# Patient Record
Sex: MALE | Race: WHITE | HISPANIC OR LATINO | URBAN - METROPOLITAN AREA
[De-identification: names, ages, dates, MRNs, and addresses within clinical notes are randomized per-mention and may not be internally consistent; named-entity substitution may affect disease eponyms.]

---

## 2017-06-30 ENCOUNTER — EMERGENCY (EMERGENCY)
Facility: HOSPITAL | Age: 42
LOS: 1 days | Discharge: HOME | End: 2017-06-30
Attending: EMERGENCY MEDICINE | Admitting: EMERGENCY MEDICINE
Payer: MEDICAID

## 2017-06-30 VITALS
TEMPERATURE: 99 F | SYSTOLIC BLOOD PRESSURE: 187 MMHG | DIASTOLIC BLOOD PRESSURE: 118 MMHG | HEART RATE: 86 BPM | OXYGEN SATURATION: 98 % | RESPIRATION RATE: 20 BRPM

## 2017-06-30 PROCEDURE — 99284 EMERGENCY DEPT VISIT MOD MDM: CPT

## 2017-06-30 PROCEDURE — 99283 EMERGENCY DEPT VISIT LOW MDM: CPT | Mod: 25

## 2017-06-30 PROCEDURE — 96372 THER/PROPH/DIAG INJ SC/IM: CPT

## 2017-06-30 RX ORDER — KETOROLAC TROMETHAMINE 30 MG/ML
30 SYRINGE (ML) INJECTION ONCE
Qty: 0 | Refills: 0 | Status: DISCONTINUED | OUTPATIENT
Start: 2017-06-30 | End: 2017-06-30

## 2017-06-30 RX ORDER — CYCLOBENZAPRINE HYDROCHLORIDE 10 MG/1
1 TABLET, FILM COATED ORAL
Qty: 20 | Refills: 0 | OUTPATIENT
Start: 2017-06-30

## 2017-06-30 RX ORDER — DIAZEPAM 5 MG
5 TABLET ORAL ONCE
Qty: 0 | Refills: 0 | Status: DISCONTINUED | OUTPATIENT
Start: 2017-06-30 | End: 2017-06-30

## 2017-06-30 RX ORDER — OXYCODONE HYDROCHLORIDE 5 MG/1
1 TABLET ORAL
Qty: 20 | Refills: 0 | OUTPATIENT
Start: 2017-06-30

## 2017-06-30 RX ORDER — OXYCODONE HYDROCHLORIDE 5 MG/1
5 TABLET ORAL ONCE
Qty: 0 | Refills: 0 | Status: DISCONTINUED | OUTPATIENT
Start: 2017-06-30 | End: 2017-06-30

## 2017-06-30 RX ORDER — TIZANIDINE 4 MG/1
0 TABLET ORAL
Qty: 0 | Refills: 0 | COMMUNITY

## 2017-06-30 RX ADMIN — Medication 5 MILLIGRAM(S): at 23:29

## 2017-06-30 RX ADMIN — Medication 30 MILLIGRAM(S): at 23:30

## 2017-06-30 RX ADMIN — OXYCODONE HYDROCHLORIDE 5 MILLIGRAM(S): 5 TABLET ORAL at 22:13

## 2017-06-30 RX ADMIN — Medication 5 MILLIGRAM(S): at 22:13

## 2017-06-30 RX ADMIN — OXYCODONE HYDROCHLORIDE 5 MILLIGRAM(S): 5 TABLET ORAL at 23:30

## 2017-06-30 RX ADMIN — OXYCODONE HYDROCHLORIDE 5 MILLIGRAM(S): 5 TABLET ORAL at 23:29

## 2017-06-30 RX ADMIN — Medication 30 MILLIGRAM(S): at 22:15
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
## 2017-06-30 NOTE — ED PROVIDER NOTE - MEDICAL DECISION MAKING DETAILS
Attending note-left lower back pain with sciatica. NSAIDs, muscle relaxants, opiates and reassess. Patient to follow up with the spine. Patient was advised if he develops red flag severely returned to the ER for further evaluation.
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
## 2017-06-30 NOTE — ED PROVIDER NOTE - PHYSICAL EXAMINATION
NAD, Hypertensive, Afebrile, No spinal mid tender, + left para lumbar tender, No sciatica tender, No CVA tender, Neuro- intact. NAD, Hypertensive, Afebrile, No spinal mid tender, + left para lumbar tender, No sciatica tender, No CVA tender, Neuro- intact.       Attending note. Patient is alert and in severe pain. Patient describes pain as 8/10. Patient has no palpable tenderness to the lumbar spine however her pain is localized to the left paralumbar in left sciatic notch. Straight leg raise is positive. Cross straight leg raise is also positive. Sensation is normal. DTRs are +2/4 equal symmetrical. There is no clonus. Abdomen is soft and nontender. There is no CVA tenderness.

## 2017-06-30 NOTE — ED ADULT NURSE NOTE - CHPI ED SYMPTOMS NEG
no bowel dysfunction/no fatigue/no anorexia/no bladder dysfunction/no neck tenderness/no motor function loss/no constipation

## 2017-07-01 VITALS
DIASTOLIC BLOOD PRESSURE: 95 MMHG | RESPIRATION RATE: 20 BRPM | SYSTOLIC BLOOD PRESSURE: 153 MMHG | HEART RATE: 86 BPM | OXYGEN SATURATION: 98 % | TEMPERATURE: 98 F

## 2020-03-05 ENCOUNTER — EMERGENCY (EMERGENCY)
Facility: HOSPITAL | Age: 45
LOS: 1 days | Discharge: HOME | End: 2020-03-05
Attending: STUDENT IN AN ORGANIZED HEALTH CARE EDUCATION/TRAINING PROGRAM
Payer: SELF-PAY

## 2020-03-05 VITALS
TEMPERATURE: 98 F | WEIGHT: 253.09 LBS | OXYGEN SATURATION: 97 % | HEIGHT: 70 IN | HEART RATE: 63 BPM | SYSTOLIC BLOOD PRESSURE: 151 MMHG | RESPIRATION RATE: 17 BRPM | DIASTOLIC BLOOD PRESSURE: 100 MMHG

## 2020-03-05 VITALS
DIASTOLIC BLOOD PRESSURE: 88 MMHG | SYSTOLIC BLOOD PRESSURE: 136 MMHG | HEART RATE: 65 BPM | OXYGEN SATURATION: 95 % | RESPIRATION RATE: 16 BRPM

## 2020-03-05 PROCEDURE — 99284 EMERGENCY DEPT VISIT MOD MDM: CPT

## 2020-03-05 PROCEDURE — 99283 EMERGENCY DEPT VISIT LOW MDM: CPT

## 2020-03-05 RX ORDER — LIDOCAINE 4 G/100G
1 CREAM TOPICAL ONCE
Refills: 0 | Status: COMPLETED | OUTPATIENT
Start: 2020-03-05 | End: 2020-03-05

## 2020-03-05 RX ORDER — ACETAMINOPHEN 500 MG
975 TABLET ORAL ONCE
Refills: 0 | Status: COMPLETED | OUTPATIENT
Start: 2020-03-05 | End: 2020-03-05

## 2020-03-05 RX ORDER — IBUPROFEN 200 MG
600 TABLET ORAL ONCE
Refills: 0 | Status: COMPLETED | OUTPATIENT
Start: 2020-03-05 | End: 2020-03-05

## 2020-03-05 RX ADMIN — Medication 975 MILLIGRAM(S): at 19:59

## 2020-03-05 RX ADMIN — Medication 600 MILLIGRAM(S): at 19:59

## 2020-03-05 RX ADMIN — LIDOCAINE 1 PATCH: 4 CREAM TOPICAL at 19:59

## 2020-03-05 NOTE — ED PROVIDER NOTE - ATTENDING CONTRIBUTION TO CARE
I performed a history and physical exam of the patient and discussed their management with the resident.  I reviewed the resident's note and agree with the documented findings and plan of care except as noted below. My medical decision making and observations are as follows:    44M no PMH p/w 1wk right sided back pain. 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 pain has been persistent since that time. Saw PT during the week and was told he had muscle spasms which he tried to work out. states that he has been having some improvement since that time but that he is still in a lot of pain. worse with movement, change in position. tingling in R toe for one day. no incontinence or saddle anesthesia. 600mg motrin TID with minimal relief but has not taken any today. no fevers or chills, abd pain, N/V, urinary symptoms.  Pt with ttp and hypertonicity of right lumbar paraspinals, no midline vertebral ttp, negative straight leg raises b/l, 5/5 strength and equal sensation bilaterally, normal gait.  thiagoley muscle spasm - will treat with lidoderm, tylenol.  No need for imaging at this time.  Will discharge with work note and out patient spine follow up.

## 2020-03-05 NOTE — ED PROVIDER NOTE - PATIENT PORTAL LINK FT
You can access the FollowMyHealth Patient Portal offered by [facility] by registering at the following website: [email or web address]. By joining [employer]’s FollowMyHealth portal, you will also be able to view your health information using other applications (apps) compatible with our system.

## 2020-03-05 NOTE — ED PROVIDER NOTE - CLINICAL SUMMARY MEDICAL DECISION MAKING FREE TEXT BOX
45yo M presenting with 1wk low back pain since twisting to catch a heavy package. worse with movement and positional changes. no F/C, saddle anesthesia, weakness or numbness. R sided thoracolumbar paraspinal hypertonicity and ttp. pain management and discharge with ortho f/u.

## 2020-03-05 NOTE — ED PROVIDER NOTE - MUSCULOSKELETAL MINIMAL EXAM
TENDERNESS/2/2 pain. right sided thoracolumbar hypertonicity with ttp/atraumatic/RANGE OF MOTION LIMITED

## 2020-03-05 NOTE — ED PROVIDER NOTE - NSFOLLOWUPINSTRUCTIONS_ED_ALL_ED_FT
you were seen in the ED today for low back pain.     you can take tylenol 500-1000mg and motrin 600mg every 6 hours for pain as needed.     salonpas patches can be purchased at the pharmacy.     avoid heavy lifting and strenuous activity.     follow up with orthopaedics.     Return to the ED if you develop any new or worsening symptoms including but not limited to the following: severe/worsening pain, numbness/tingling in the extremities, weakness, loss of control of bowel or bladder, chest pain, shortness of breath.

## 2020-03-05 NOTE — ED PROVIDER NOTE - OBJECTIVE STATEMENT
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 persisted since that time. saw PT this week who noted muscle spasm and massaged the area. worse with movement, coughing, change in position. 600mg motrin TID with minimal relief. topical biofreeze and heat packs. radiating to midthigh. tingling R toes. no F/C, + nausea, n o vom bleeding incontinence. 45yo M no significant PMH presenting with complaints of 1wk right sided back pain. 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 pain has been persistent since that time. Saw PT during the week and was told he had muscle spasms which he tried to work out. states that he has been having some improvement since that time but that he is still in a lot of pain. worse with movement, change in position. tingling in R toe for one day. no incontinence or saddle anesthesia. 600mg motrin TID with minimal relief. topical biofreeze and heat packs. no fevers or chills, abd pain, N/V, urinary symptoms. denies drug use.

## 2021-08-27 NOTE — ED ADULT NURSE NOTE - NURSING NEURO ORIENTATION
No changes at this time, patient to keep scheduled follow up appointment.    Call placed to patient to convey results. No answer, left message for patient to return call.     Lipid Panel  Component      Latest Ref Rng & Units 8/26/2021             Fasting Status      >0 - 999 Hours 12   CHOLESTEROL      <=199 mg/dL 128   TRIGLYCERIDE      <=149 mg/dL 75   HDL      >=40 mg/dL 47   CALCULATED LDL      <=129 mg/dL 66   CALCULATED NON HDL      mg/dL 81   CHOL/HDL      <=4.4 2.7       Hepatic Function Panel  Component      Latest Ref Rng & Units 8/26/2021   Albumin      3.6 - 5.1 g/dL 4.2   TOTAL BILIRUBIN      0.2 - 1.0 mg/dL 0.9   DIRECT BILIRUBIN      0.0 - 0.2 mg/dL 0.2   ALK PHOSPHATASE      45 - 117 Units/L 106   ALT/SGPT      <64 Units/L 57   AST/SGOT      <=37 Units/L 33   TOTAL PROTEIN      6.4 - 8.2 g/dL 7.8     
Patient's wife returned call, informed of lab results, no changes at this time, keep f/u as scheduled    Wife verbalizes understandign  
oriented to person, place and time